# Patient Record
(demographics unavailable — no encounter records)

---

## 2024-11-19 NOTE — ADDENDUM
[FreeTextEntry1] : This note was written by Savage Posada, acting as a scribe for Dr. Aleta Chamorro.  I, Dr. Aleta Chamorro, have read and attest that all the information, medical decision-making, and discharge instructions within are true and accurate.  I, Dr. Aleta Chamorro, personally performed the evaluation and management (E/M) services for this established patient who presents today with (an) existing condition(s).  That E/M includes conducting the examination, assessing all conditions, and (re)establishing/reinforcing a plan of care.  Today, my ACP, Savage Posada, was here to observe my evaluation and management services for this condition to be followed going forward.

## 2024-11-19 NOTE — PHYSICAL EXAM
[Normal Thyroid] : the thyroid was normal [Normal Breath Sounds] : Normal breath sounds [Respiratory Effort] : normal respiratory effort [Normal Heart Sounds] : normal heart sounds [Normal Rate and Rhythm] : normal rate and rhythm [2+] : left 2+ [1+] : right 1+ [0] : left 0 [No HSM] : no hepatosplenomegaly [No Rash or Lesion] : No rash or lesion [Alert] : alert [Calm] : calm [JVD] : no jugular venous distention  [Carotid Bruits] : no carotid bruits [Right Carotid Bruit] : no bruit heard over the right carotid [Left Carotid Bruit] : no bruit heard over the left carotid [Ankle Swelling (On Exam)] : not present [Varicose Veins Of Lower Extremities] : not present [] : not present [Abdomen Masses] : No abdominal masses [Abdomen Tenderness] : ~T ~M No abdominal tenderness [Purpura] : no purpura  [Petechiae] : no petechiae [Skin Ulcer] : no ulcer [Skin Induration] : no induration [de-identified] : Healthy, NAD [de-identified] : NC/AT, anicteric [de-identified] : FROM throughout, strength 5/5x4, no muscle atrophy noted in LEs [de-identified] : R surgical incisions well-healed w/o evidence of erythema, drainage, or dehiscence [de-identified] : Neurosensory/neuromotor grossly intact

## 2024-11-19 NOTE — REASON FOR VISIT
[Spouse] : spouse [Family Member] : family member [de-identified] : R fem-pop bypass w/PTFE [de-identified] : 11/06/2024 [de-identified] : 13 [de-identified] : 2 [de-identified] : 80yoM previously underwent unsuccessful RLE angio for moderate-severe claudication, now s/p R fem-pop bypass w/PTFE and returning for POC.  Pt now reports significantly improved R calf pain/tightness, able to walk longer distances w/o symptoms; pt does report persistent swelling and heaviness in the RLE since surgery that has not resolved w/elevation and rest.

## 2024-11-19 NOTE — DISCUSSION/SUMMARY
[FreeTextEntry1] : 80yoM previously underwent unsuccessful RLE angio for moderate-severe claudication, now s/p R fem-BK pop bypass w/PTFE and returning for POC.  Pt now reports significantly improved R calf pain/tightness, able to walk longer distances w/o symptoms; pt does report persistent swelling and heaviness in the RLE since surgery that has not resolved w/elevation and rest.  Legs well-perfused and swelling mild-moderate.  RLE arterial duplex performed today demonstrates a widely patent R fem-pop bypass w/runoff to foot via PTA/peroneal artery.  Pt will continue to exercise regularly and elevate her leg whenever not in use; he will RTO in 6mos for surveillance of his bypass.

## 2024-11-19 NOTE — REASON FOR VISIT
[Spouse] : spouse [Family Member] : family member [de-identified] : R fem-pop bypass w/PTFE [de-identified] : 11/06/2024 [de-identified] : 13 [de-identified] : 2 [de-identified] : 80yoM previously underwent unsuccessful RLE angio for moderate-severe claudication, now s/p R fem-pop bypass w/PTFE and returning for POC.  Pt now reports significantly improved R calf pain/tightness, able to walk longer distances w/o symptoms; pt does report persistent swelling and heaviness in the RLE since surgery that has not resolved w/elevation and rest.

## 2024-11-19 NOTE — PHYSICAL EXAM
[Normal Thyroid] : the thyroid was normal [Normal Breath Sounds] : Normal breath sounds [Respiratory Effort] : normal respiratory effort [Normal Heart Sounds] : normal heart sounds [Normal Rate and Rhythm] : normal rate and rhythm [2+] : left 2+ [1+] : right 1+ [0] : left 0 [No HSM] : no hepatosplenomegaly [No Rash or Lesion] : No rash or lesion [Alert] : alert [Calm] : calm [JVD] : no jugular venous distention  [Carotid Bruits] : no carotid bruits [Right Carotid Bruit] : no bruit heard over the right carotid [Left Carotid Bruit] : no bruit heard over the left carotid [Ankle Swelling (On Exam)] : not present [Varicose Veins Of Lower Extremities] : not present [] : not present [Abdomen Masses] : No abdominal masses [Abdomen Tenderness] : ~T ~M No abdominal tenderness [Purpura] : no purpura  [Petechiae] : no petechiae [Skin Ulcer] : no ulcer [Skin Induration] : no induration [de-identified] : Healthy, NAD [de-identified] : NC/AT, anicteric [de-identified] : FROM throughout, strength 5/5x4, no muscle atrophy noted in LEs [de-identified] : R surgical incisions well-healed w/o evidence of erythema, drainage, or dehiscence [de-identified] : Neurosensory/neuromotor grossly intact

## 2024-12-04 NOTE — DISCUSSION/SUMMARY
[FreeTextEntry1] : 80yoM previously underwent unsuccessful RLE angio for moderate-severe claudication, now s/p R fem-BK pop bypass w/PTFE and returning for POC.  Pt now reports significantly improved R calf pain/tightness, able to walk longer distances w/o symptoms; pt does report persistent swelling and heaviness in the RLE since surgery that has not resolved w/elevation and rest.  Legs well-perfused and swelling mild-moderate.  Sutures removed from the medial calf today and replaced w/benzoin/steri strips.  Pt will continue to exercise regularly and elevate her leg whenever not in use; he will RTO in 6mos for surveillance of his bypass.

## 2024-12-04 NOTE — PHYSICAL EXAM
[Normal Thyroid] : the thyroid was normal [Normal Breath Sounds] : Normal breath sounds [Respiratory Effort] : normal respiratory effort [Normal Heart Sounds] : normal heart sounds [Normal Rate and Rhythm] : normal rate and rhythm [2+] : left 2+ [1+] : right 1+ [0] : left 0 [No HSM] : no hepatosplenomegaly [No Rash or Lesion] : No rash or lesion [Alert] : alert [Calm] : calm [JVD] : no jugular venous distention  [Carotid Bruits] : no carotid bruits [Right Carotid Bruit] : no bruit heard over the right carotid [Left Carotid Bruit] : no bruit heard over the left carotid [Ankle Swelling (On Exam)] : not present [Varicose Veins Of Lower Extremities] : not present [] : not present [Abdomen Masses] : No abdominal masses [Abdomen Tenderness] : ~T ~M No abdominal tenderness [Purpura] : no purpura  [Petechiae] : no petechiae [Skin Ulcer] : no ulcer [Skin Induration] : no induration [de-identified] : Healthy, NAD [de-identified] : NC/AT, anicteric [de-identified] : FROM throughout, strength 5/5x4, no muscle atrophy noted in LEs [de-identified] : R surgical incisions well-healed w/o evidence of erythema, drainage, or dehiscence [de-identified] : Neurosensory/neuromotor grossly intact

## 2024-12-04 NOTE — ADDENDUM
[FreeTextEntry1] : This note was written by Savage Posada, acting as a scribe for Dr. Aleta Chamorro.  I, Dr. Aleta hCamorro, have read and attest that all the information, medical decision-making, and discharge instructions within are true and accurate.  I, Dr. Aleta Chamorro, personally performed the evaluation and management (E/M) services for this established patient who presents today with (an) existing condition(s).  That E/M includes conducting the examination, assessing all conditions, and (re)establishing/reinforcing a plan of care.  Today, my ACP, Savage Posada, was here to observe my evaluation and management services for this condition to be followed going forward.

## 2024-12-04 NOTE — PHYSICAL EXAM
[Normal Thyroid] : the thyroid was normal [Normal Breath Sounds] : Normal breath sounds [Respiratory Effort] : normal respiratory effort [Normal Heart Sounds] : normal heart sounds [Normal Rate and Rhythm] : normal rate and rhythm [2+] : left 2+ [1+] : right 1+ [0] : left 0 [No HSM] : no hepatosplenomegaly [No Rash or Lesion] : No rash or lesion [Alert] : alert [Calm] : calm [JVD] : no jugular venous distention  [Carotid Bruits] : no carotid bruits [Right Carotid Bruit] : no bruit heard over the right carotid [Left Carotid Bruit] : no bruit heard over the left carotid [Ankle Swelling (On Exam)] : not present [Varicose Veins Of Lower Extremities] : not present [] : not present [Abdomen Masses] : No abdominal masses [Abdomen Tenderness] : ~T ~M No abdominal tenderness [Purpura] : no purpura  [Petechiae] : no petechiae [Skin Ulcer] : no ulcer [Skin Induration] : no induration [de-identified] : Healthy, NAD [de-identified] : NC/AT, anicteric [de-identified] : FROM throughout, strength 5/5x4, no muscle atrophy noted in LEs [de-identified] : R surgical incisions well-healed w/o evidence of erythema, drainage, or dehiscence [de-identified] : Neurosensory/neuromotor grossly intact

## 2024-12-04 NOTE — REASON FOR VISIT
[Spouse] : spouse [Family Member] : family member [de-identified] : R fem-pop bypass w/PTFE [de-identified] : 11/06/2024 [de-identified] : 4 [de-identified] : 80yoM previously underwent unsuccessful RLE angio for moderate-severe claudication, now s/p R fem-pop bypass w/PTFE and returning for POC.  Pt now reports significantly improved R calf pain/tightness, able to walk longer distances w/o symptoms; pt does report persistent swelling and heaviness in the RLE since surgery that has not resolved w/elevation and rest.

## 2024-12-04 NOTE — REASON FOR VISIT
[Spouse] : spouse [Family Member] : family member [de-identified] : R fem-pop bypass w/PTFE [de-identified] : 11/06/2024 [de-identified] : 4 [de-identified] : 80yoM previously underwent unsuccessful RLE angio for moderate-severe claudication, now s/p R fem-pop bypass w/PTFE and returning for POC.  Pt now reports significantly improved R calf pain/tightness, able to walk longer distances w/o symptoms; pt does report persistent swelling and heaviness in the RLE since surgery that has not resolved w/elevation and rest.

## 2025-05-30 NOTE — ADDENDUM
[FreeTextEntry1] : This note was written by Savita RIOS, acting as a scribe for Dr. Aleta Chamorro.  I, Dr. Aleta Chamorro, have read and attest that all the information, medical decision-making, and discharge instructions within are true and accurate.  I, Dr. Aleta Chamorro, personally performed the evaluation and management (E/M) services for this established patient who presents today with (a) new problem(s)/exacerbation of (an) existing condition(s).  That E/M includes conducting the examination, assessing all new/exacerbated conditions, and establishing a new plan of care.  Today, my ACP, Savita RIOS, was here to observe my evaluation and management services for this new problem/exacerbated condition to be followed going forward.

## 2025-05-30 NOTE — PHYSICAL EXAM
[Alert] : alert [Calm] : calm [2+] : right 2+ [1+] : right 1+ [0] : right 0 [Abdomen Tenderness] : ~T ~M No abdominal tenderness [de-identified] : WN/WD, NAD [de-identified] : NC/AT [de-identified] : FROM [de-identified] : grossly intact

## 2025-05-30 NOTE — HISTORY OF PRESENT ILLNESS
[FreeTextEntry1] : 80yoM previously underwent unsuccessful RLE angio for moderate-severe claudication, now s/p R fem-pop bypass w/PTFE on 11/6/24, returns for a follow up. Pt now reports worsening R calf pain/tightness that he developed few months ago, unable able to walk longer distances w/o symptoms. Denies rest pain, skin changes.

## 2025-05-30 NOTE — ASSESSMENT
[FreeTextEntry1] : 80yoM previously underwent unsuccessful RLE angio for moderate-severe claudication, now s/p R fem-pop bypass w/PTFE on 11/6/24, returns for a follow up. Pt now reports worsening R calf pain/tightness that he developed few months ago, unable able to walk longer distances w/o symptoms. Denies rest pain, skin changes. Both legs are well perfused, no edema or skin discoloration. Diminished RLE peripheral pulses. RLE Arterial duplex was done in the office that demonstrated patent bypass graft with flow restrictive lesion in the distal anastomotic site, >75%. 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 We discussed the findings and recommended to proceed with RLE angiogram/PTA to prevent bypass failure. RABs were discussed, all questions answered. He agrees and would like to proceed. [Arterial/Venous Disease] : arterial/venous disease

## 2025-05-30 NOTE — PROCEDURE
[FreeTextEntry1] : RLE Arterial duplex was done in the office that demonstrated patent bypass graft with flow restrictive lesion in the distal anastomotic site, >75%. 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

## 2025-05-30 NOTE — PHYSICAL EXAM
[Alert] : alert [Calm] : calm [2+] : right 2+ [1+] : right 1+ [0] : right 0 [Abdomen Tenderness] : ~T ~M No abdominal tenderness [de-identified] : WN/WD, NAD [de-identified] : FROM [de-identified] : NC/AT [de-identified] : grossly intact